# Patient Record
Sex: FEMALE | Race: WHITE | NOT HISPANIC OR LATINO | Employment: FULL TIME | URBAN - METROPOLITAN AREA
[De-identification: names, ages, dates, MRNs, and addresses within clinical notes are randomized per-mention and may not be internally consistent; named-entity substitution may affect disease eponyms.]

---

## 2018-01-07 PROBLEM — R07.9 CHEST PAIN: Status: ACTIVE | Noted: 2018-01-07

## 2018-01-08 PROBLEM — I20.0 UNSTABLE ANGINA: Status: ACTIVE | Noted: 2018-01-07

## 2018-05-29 ENCOUNTER — TELEPHONE (OUTPATIENT)
Dept: BARIATRICS | Facility: CLINIC | Age: 63
End: 2018-05-29

## 2019-05-26 PROBLEM — I25.118 CORONARY ARTERY DISEASE OF NATIVE HEART WITH STABLE ANGINA PECTORIS: Status: ACTIVE | Noted: 2019-05-26

## 2019-05-30 PROBLEM — Z79.899 ON STATIN THERAPY: Status: ACTIVE | Noted: 2019-05-30

## 2019-05-30 PROBLEM — Z79.82 ASPIRIN LONG-TERM USE: Status: ACTIVE | Noted: 2019-05-30

## 2020-01-04 PROBLEM — D64.9 ANEMIA: Status: ACTIVE | Noted: 2020-01-04

## 2020-01-07 PROBLEM — I73.00 RAYNAUD'S SYNDROME: Status: ACTIVE | Noted: 2020-01-07

## 2020-01-07 PROBLEM — E13.9 DIABETES 1.5, MANAGED AS TYPE 2: Status: ACTIVE | Noted: 2020-01-07

## 2020-02-02 PROBLEM — K58.9 IBS (IRRITABLE BOWEL SYNDROME): Chronic | Status: ACTIVE | Noted: 2020-02-02

## 2020-05-01 PROBLEM — M47.814 SPONDYLOSIS OF THORACIC SPINE: Status: ACTIVE | Noted: 2020-05-01

## 2020-11-13 PROBLEM — I20.0 UNSTABLE ANGINA: Status: RESOLVED | Noted: 2018-01-07 | Resolved: 2020-11-13

## 2020-11-13 PROBLEM — E13.9 DIABETES 1.5, MANAGED AS TYPE 2: Status: RESOLVED | Noted: 2020-01-07 | Resolved: 2020-11-13

## 2021-05-10 ENCOUNTER — PATIENT MESSAGE (OUTPATIENT)
Dept: RESEARCH | Facility: HOSPITAL | Age: 66
End: 2021-05-10

## 2021-05-26 PROBLEM — I25.10 CORONARY ARTERY DISEASE: Status: ACTIVE | Noted: 2018-01-08

## 2021-05-26 PROBLEM — E11.9 TYPE 2 DIABETES MELLITUS: Status: ACTIVE | Noted: 2019-03-21

## 2021-05-26 PROBLEM — F32.A DEPRESSIVE DISORDER: Status: ACTIVE | Noted: 2021-05-26

## 2021-05-26 PROBLEM — I10 ESSENTIAL HYPERTENSION: Status: ACTIVE | Noted: 2019-03-21

## 2021-06-21 ENCOUNTER — OFFICE VISIT (OUTPATIENT)
Dept: URGENT CARE | Facility: CLINIC | Age: 66
End: 2021-06-21
Payer: MEDICARE

## 2021-06-21 VITALS
OXYGEN SATURATION: 99 % | HEART RATE: 60 BPM | DIASTOLIC BLOOD PRESSURE: 59 MMHG | TEMPERATURE: 98 F | WEIGHT: 232.81 LBS | HEIGHT: 60 IN | RESPIRATION RATE: 16 BRPM | SYSTOLIC BLOOD PRESSURE: 128 MMHG | BODY MASS INDEX: 45.71 KG/M2

## 2021-06-21 DIAGNOSIS — H57.12 LEFT EYE PAIN: ICD-10-CM

## 2021-06-21 DIAGNOSIS — S05.02XA ABRASION OF LEFT CORNEA, INITIAL ENCOUNTER: Primary | ICD-10-CM

## 2021-06-21 PROCEDURE — 99214 OFFICE O/P EST MOD 30 MIN: CPT | Mod: S$GLB,,, | Performed by: PHYSICIAN ASSISTANT

## 2021-06-21 PROCEDURE — 99214 PR OFFICE/OUTPT VISIT, EST, LEVL IV, 30-39 MIN: ICD-10-PCS | Mod: S$GLB,,, | Performed by: PHYSICIAN ASSISTANT

## 2021-06-21 RX ORDER — OFLOXACIN 3 MG/ML
2 SOLUTION/ DROPS OPHTHALMIC 4 TIMES DAILY
Qty: 10 ML | Refills: 0 | Status: SHIPPED | OUTPATIENT
Start: 2021-06-21 | End: 2021-06-28

## 2022-02-17 PROBLEM — E11.51 TYPE 2 DIABETES MELLITUS WITH ATHEROSCLEROSIS OF AORTA: Status: ACTIVE | Noted: 2019-03-21

## 2022-02-17 PROBLEM — E11.59 TYPE 2 DIABETES MELLITUS WITH ATHEROSCLEROSIS OF AORTA: Status: ACTIVE | Noted: 2019-03-21

## 2022-02-17 PROBLEM — I70.0 TYPE 2 DIABETES MELLITUS WITH ATHEROSCLEROSIS OF AORTA: Status: ACTIVE | Noted: 2019-03-21

## 2022-03-16 ENCOUNTER — PATIENT OUTREACH (OUTPATIENT)
Dept: ADMINISTRATIVE | Facility: HOSPITAL | Age: 67
End: 2022-03-16
Payer: MEDICARE

## 2022-03-16 NOTE — PROGRESS NOTES
[facility] CMS chart audits. (FLU VACCINE) Chart review completed for HM test overdue (mammograms, Colonoscopies, pap smears, DM labs, and/or EYE EXAMs)       Care Everywhere and media, updates requested and reviewed.             There is no record of a Flu vaccine noted for the patient for the 2021 measurement year.

## 2022-07-31 PROBLEM — E11.59 TYPE 2 DIABETES MELLITUS WITH ATHEROSCLEROSIS OF AORTA: Chronic | Status: ACTIVE | Noted: 2019-03-21

## 2022-07-31 PROBLEM — E11.51 TYPE 2 DIABETES MELLITUS WITH ATHEROSCLEROSIS OF AORTA: Chronic | Status: ACTIVE | Noted: 2019-03-21

## 2022-07-31 PROBLEM — I70.0 TYPE 2 DIABETES MELLITUS WITH ATHEROSCLEROSIS OF AORTA: Chronic | Status: ACTIVE | Noted: 2019-03-21

## 2022-11-10 PROBLEM — Z79.899 ON STATIN THERAPY: Chronic | Status: ACTIVE | Noted: 2019-05-30

## 2022-11-10 PROBLEM — I73.00 RAYNAUD'S SYNDROME: Chronic | Status: ACTIVE | Noted: 2020-01-07

## 2022-11-10 PROBLEM — F32.A DEPRESSIVE DISORDER: Chronic | Status: ACTIVE | Noted: 2021-05-26

## 2022-11-10 PROBLEM — I10 ESSENTIAL HYPERTENSION: Chronic | Status: ACTIVE | Noted: 2019-03-21

## 2022-11-15 PROBLEM — Z00.01 ENCOUNTER FOR MEDICARE ANNUAL EXAMINATION WITH ABNORMAL FINDINGS: Status: ACTIVE | Noted: 2022-11-15

## 2022-11-15 PROBLEM — N95.0 POST-MENOPAUSAL BLEEDING: Status: ACTIVE | Noted: 2022-11-15

## 2022-11-15 PROBLEM — H35.30 MACULAR DEGENERATION OF BOTH EYES: Chronic | Status: ACTIVE | Noted: 2022-11-15

## 2022-11-16 PROBLEM — U09.9 COVID-19 LONG HAULER: Chronic | Status: ACTIVE | Noted: 2022-11-16

## 2022-11-16 PROBLEM — Z91.81 AT RISK FOR INJURY RELATED TO FALL: Chronic | Status: ACTIVE | Noted: 2022-11-16

## 2023-02-20 PROBLEM — Z00.01 ENCOUNTER FOR MEDICARE ANNUAL EXAMINATION WITH ABNORMAL FINDINGS: Status: RESOLVED | Noted: 2022-11-15 | Resolved: 2023-02-20

## 2023-11-27 PROBLEM — R06.02 SOB (SHORTNESS OF BREATH): Status: ACTIVE | Noted: 2023-11-27

## 2023-11-27 PROBLEM — E55.9 VITAMIN D DEFICIENCY: Status: ACTIVE | Noted: 2023-11-27

## 2023-12-04 ENCOUNTER — PATIENT MESSAGE (OUTPATIENT)
Dept: ADMINISTRATIVE | Facility: OTHER | Age: 68
End: 2023-12-04
Payer: MEDICARE

## 2023-12-05 PROBLEM — E79.0 HYPERURICEMIA: Status: ACTIVE | Noted: 2023-12-05

## 2023-12-05 PROBLEM — E61.2 MAGNESIUM DEFICIENCY: Status: ACTIVE | Noted: 2023-12-05

## 2023-12-21 ENCOUNTER — PATIENT MESSAGE (OUTPATIENT)
Dept: OTHER | Facility: OTHER | Age: 68
End: 2023-12-21
Payer: MEDICARE

## 2023-12-21 ENCOUNTER — PATIENT MESSAGE (OUTPATIENT)
Dept: ADMINISTRATIVE | Facility: OTHER | Age: 68
End: 2023-12-21
Payer: MEDICARE

## 2024-02-13 ENCOUNTER — PATIENT MESSAGE (OUTPATIENT)
Dept: ADMINISTRATIVE | Facility: OTHER | Age: 69
End: 2024-02-13
Payer: MEDICARE

## 2024-02-20 ENCOUNTER — PATIENT MESSAGE (OUTPATIENT)
Dept: OTHER | Facility: OTHER | Age: 69
End: 2024-02-20
Payer: MEDICARE

## 2024-03-07 ENCOUNTER — PATIENT MESSAGE (OUTPATIENT)
Dept: OTHER | Facility: OTHER | Age: 69
End: 2024-03-07
Payer: MEDICARE

## 2024-04-28 PROBLEM — E61.2 MAGNESIUM DEFICIENCY: Chronic | Status: ACTIVE | Noted: 2023-12-05

## 2024-04-28 PROBLEM — E55.9 VITAMIN D DEFICIENCY: Chronic | Status: ACTIVE | Noted: 2023-11-27

## 2024-04-28 PROBLEM — E79.0 HYPERURICEMIA: Chronic | Status: ACTIVE | Noted: 2023-12-05

## 2024-04-30 PROBLEM — F34.1 PERSISTENT DEPRESSIVE DISORDER: Status: ACTIVE | Noted: 2021-05-26

## 2024-04-30 PROBLEM — E66.2 CLASS 3 OBESITY WITH ALVEOLAR HYPOVENTILATION, SERIOUS COMORBIDITY, AND BODY MASS INDEX (BMI) OF 45.0 TO 49.9 IN ADULT: Chronic | Status: ACTIVE | Noted: 2024-04-30

## 2024-04-30 PROBLEM — E11.628 TYPE 2 DIABETES MELLITUS WITH PRESSURE CALLUS: Status: ACTIVE | Noted: 2019-03-21

## 2024-04-30 PROBLEM — J01.40 ACUTE NON-RECURRENT PANSINUSITIS: Status: ACTIVE | Noted: 2024-04-30

## 2024-04-30 PROBLEM — H35.3131 EARLY DRY STAGE NONEXUDATIVE AGE-RELATED MACULAR DEGENERATION OF BOTH EYES: Status: ACTIVE | Noted: 2022-11-15

## 2024-04-30 PROBLEM — L84 TYPE 2 DIABETES MELLITUS WITH PRESSURE CALLUS: Status: ACTIVE | Noted: 2019-03-21

## 2024-04-30 PROBLEM — E66.813 CLASS 3 OBESITY WITH ALVEOLAR HYPOVENTILATION, SERIOUS COMORBIDITY, AND BODY MASS INDEX (BMI) OF 45.0 TO 49.9 IN ADULT: Chronic | Status: ACTIVE | Noted: 2024-04-30

## 2024-04-30 PROBLEM — Z87.19 HISTORY OF PANCREATITIS: Status: ACTIVE | Noted: 2024-04-30

## 2024-08-05 PROBLEM — J01.40 ACUTE NON-RECURRENT PANSINUSITIS: Status: RESOLVED | Noted: 2024-04-30 | Resolved: 2024-08-05

## 2024-08-09 NOTE — PROGRESS NOTES
CC: Ms. Sheron Boudreaux arrives today for management of Type 2 DM and review of chronic medical conditions, as listed in the Visit Diagnosis section of this encounter.       HPI: Ms. Sheron Boudreaux was diagnosed with Type 2 DM in 2003. She was diagnosed based on lab work. Initial treatment consisted of metformin. + FH of DM in mother, father. Denies hospitalizations due to DM.   Of note, pt has past h/o isolated pancreatitis, following hysterectomy. Has followed with Dr. Romero.   She has dx of CAD with past stent placement. Follows with Dr. Solorzano.     This is her first time being seen in endocrine.     Primary Care recently added Jardiance but pt had to stop, due to cost.    She reports she has gained weight since recovering from COVID pneumonia in 2022 and has had difficulty losing.     BG readings are checked 1x/day, usually fasting. No logs to clinic. Reports these range 140s.    Hypoglycemia: No    Missing Insulin/PO medication doses: No    Exercise: Not lately. Has been taking care of 4 y/o twin grandchildren over summer break. Prior to that, was doing treadmill and weights at [facility].     Dietary Habits:  Eats 2-3 meals/day. Occasional snack in afternoon - fruit. Avoids sugary beverages.    Last DM education appointment: 3/2020      CURRENT DIABETIC MEDS:  metformin 1000 mg BID  Glucometer type:     Previous DM treatments:  Jardiance - cost    Last Eye Exam: 9/7/2023, Dr. Denny. No DR. + dry MD. Has appt this month.   Last Podiatry Exam: n/a    REVIEW OF SYSTEMS  Constitutional: no c/o fatigue, weakness, or weight loss.   Cardiac: no palpitations or chest pain.  Respiratory: no dyspnea. + occasional dry cough  GI: + chronic lower abdominal pain that waxes and wanes.  Reports dx of irritable bowel syndrome, + nausea in AM. Rare vomiting but may occur with eating animal proteins. + h/o pancreatitis x 1. Follows with Dr. Romero.   : denies urinary frequency, burning,  discharge, frequent UTIs  Skin: no lesions or rashes.  Neuro: + numbness to L lower leg that comes and goes.   Endocrine: denies polyphagia, polydipsia, polyuria      Personally reviewed Past Medical, Surgical, Social History.    Vital Signs  /74   Pulse 74   Ht 5' (1.524 m)   Wt 109.5 kg (241 lb 6.5 oz)   BMI 47.15 kg/m²     Personally reviewed the below labs:    Hemoglobin A1C   Date Value Ref Range Status   05/13/2024 7.0 (H) 0.0 - 5.6 % Final     Comment:     Reference Interval:  5.0 - 5.6 Normal   5.7 - 6.4 High Risk   > 6.5 Diabetic      Hgb A1c results are standardized based on the ([facility]) National   Glycohemoglobin Standardization Program.      Hemoglobin A1C levels are related to mean serum/plasma glucose   during the preceding 2-3 months.        12/02/2023 6.7 (H) 0.0 - 5.6 % Final     Comment:     Reference Interval:  5.0 - 5.6 Normal   5.7 - 6.4 High Risk   > 6.5 Diabetic      Hgb A1c results are standardized based on the ([facility]) National   Glycohemoglobin Standardization Program.      Hemoglobin A1C levels are related to mean serum/plasma glucose   during the preceding 2-3 months.        06/02/2023 6.8 (H) 0.0 - 5.6 % Final     Comment:     Reference Interval:  5.0 - 5.6 Normal   5.7 - 6.4 High Risk   > 6.5 Diabetic      Hgb A1c results are standardized based on the ([facility]) National   Glycohemoglobin Standardization Program.      Hemoglobin A1C levels are related to mean serum/plasma glucose   during the preceding 2-3 months.            Chemistry        Component Value Date/Time     05/13/2024 0911     07/16/2015 1000    K 4.6 05/13/2024 0911    K 4.5 07/16/2015 1000     05/13/2024 0911    CL 98 07/16/2015 1000    CO2 31 05/13/2024 0911    BUN 21 (H) 05/13/2024 0911    CREATININE 1.05 05/13/2024 0911    CREATININE 0.89 07/16/2015 1000     (H) 05/13/2024 0911        Component Value Date/Time    CALCIUM 9.1 05/13/2024 0911    ALKPHOS 82 05/13/2024 0911    AST 24 05/13/2024  0911    AST 13 (L) 04/09/2016 1013    ALT 23 05/13/2024 0911    BILITOT 0.3 05/13/2024 0911    ESTGFRAFRICA >60 02/26/2022 1014    EGFRNONAA >60 02/26/2022 1014          Lab Results   Component Value Date    CHOL 148 05/13/2024    CHOL 186 12/02/2023    CHOL 192 06/02/2023     Lab Results   Component Value Date    HDL 52 05/13/2024    HDL 44 12/02/2023    HDL 55 06/02/2023     Lab Results   Component Value Date    LDLCALC 62.4 (L) 05/13/2024    LDLCALC 112.6 12/02/2023    LDLCALC 108.8 06/02/2023     Lab Results   Component Value Date    TRIG 168 (H) 05/13/2024    TRIG 147 12/02/2023    TRIG 141 06/02/2023     Lab Results   Component Value Date    CHOLHDL 35.1 05/13/2024    CHOLHDL 23.7 12/02/2023    CHOLHDL 28.6 06/02/2023       Lab Results   Component Value Date    MICALBCREAT Unable to calculate 05/13/2024     Lab Results   Component Value Date    TSH 2.470 05/13/2024       CrCl cannot be calculated (Patient's most recent lab result is older than the maximum 7 days allowed.).    Vit D, 25-Hydroxy   Date Value Ref Range Status   05/13/2024 42 30 - 96 ng/mL Final     Comment:     Vitamin D deficiency.........<10 ng/mL                              Vitamin D insufficiency......10-29 ng/mL       Vitamin D sufficiency........> or equal to 30 ng/mL  Vitamin D toxicity............>100 ng/mL           PHYSICAL EXAMINATION  Constitutional: Appears well, no distress  Respiratory: CTA, even and unlabored.  Cardiovascular: RRR, no murmurs, no carotid bruits.  GI: bowel sounds active, no hernia noted  Skin: warm and dry; no visible wounds  Neuro: oriented to person, place, time  Feet: appropriate footwear.     Goals         80 <= Glucose <= 180 (pt-stated)       Blood Pressure < 140/90       Exercise at least 150 minutes per week.       HEMOGLOBIN A1C < 8.0       Take at least one BP reading per week at various times of the day               Assessment/Plan  1. Type 2 diabetes mellitus with other specified complication,  without long-term current use of insulin  -- A1c is reasonable but is due for updated labs. A1c, CMP today.   -- Cannot use GLP-1RA or DPP4-I due to h/o pancreatitis and underlying GI upset. SGLT2-I was too costly. Discussed adding SHETH but this is not favorable, due to possible side effects of weight gain, hypoglycemia. If needed, would choose lowest dose.   -- Pt agrees to inquire SGLT2-I cost with insurance. She doesn't think that she'll qualify for Patient Assistance  -- offered to change metformin to XR to see if this helps reduce her GI symptoms but she declined   -- takes aspirin, ace-I, statin   2. Coronary artery disease involving native coronary artery of native heart without angina pectoris  -- follows with cardiology    3. Essential hypertension  -- controlled  -- continue current medications   4. Pure hypercholesterolemia  -- uncontrolled with mildly elevated TRG  -- continue statin   5. Hypothyroidism, unspecified type  -- stable  -- continue current LT4 dose   6. History of pancreatitis  -- isolated episode  -- caution with use of GLP-1RA, DPP4-i         FOLLOW UP  Follow up in about 4 months (around 12/12/2024).   Patient instructed to bring BG logs to each follow up   Patient encouraged to call for any BG/medication issues, concerns, or questions.    Orders Placed This Encounter   Procedures    Hemoglobin A1C    Comprehensive Metabolic Panel

## 2024-08-12 ENCOUNTER — LAB VISIT (OUTPATIENT)
Dept: LAB | Facility: HOSPITAL | Age: 69
End: 2024-08-12
Attending: NURSE PRACTITIONER
Payer: MEDICARE

## 2024-08-12 ENCOUNTER — OFFICE VISIT (OUTPATIENT)
Dept: ENDOCRINOLOGY | Facility: CLINIC | Age: 69
End: 2024-08-12
Payer: MEDICARE

## 2024-08-12 VITALS
DIASTOLIC BLOOD PRESSURE: 74 MMHG | HEIGHT: 60 IN | WEIGHT: 241.38 LBS | HEART RATE: 74 BPM | SYSTOLIC BLOOD PRESSURE: 126 MMHG | BODY MASS INDEX: 47.39 KG/M2

## 2024-08-12 DIAGNOSIS — I10 ESSENTIAL HYPERTENSION: Chronic | ICD-10-CM

## 2024-08-12 DIAGNOSIS — I25.10 CORONARY ARTERY DISEASE INVOLVING NATIVE CORONARY ARTERY OF NATIVE HEART WITHOUT ANGINA PECTORIS: ICD-10-CM

## 2024-08-12 DIAGNOSIS — Z87.19 HISTORY OF PANCREATITIS: ICD-10-CM

## 2024-08-12 DIAGNOSIS — E11.69 TYPE 2 DIABETES MELLITUS WITH OTHER SPECIFIED COMPLICATION, WITHOUT LONG-TERM CURRENT USE OF INSULIN: Primary | ICD-10-CM

## 2024-08-12 DIAGNOSIS — E78.00 PURE HYPERCHOLESTEROLEMIA: Chronic | ICD-10-CM

## 2024-08-12 DIAGNOSIS — E03.9 HYPOTHYROIDISM, UNSPECIFIED TYPE: ICD-10-CM

## 2024-08-12 DIAGNOSIS — E11.69 TYPE 2 DIABETES MELLITUS WITH OTHER SPECIFIED COMPLICATION, WITHOUT LONG-TERM CURRENT USE OF INSULIN: ICD-10-CM

## 2024-08-12 LAB
ALBUMIN SERPL BCP-MCNC: 3.7 G/DL (ref 3.5–5.2)
ALP SERPL-CCNC: 92 U/L (ref 55–135)
ALT SERPL W/O P-5'-P-CCNC: 14 U/L (ref 10–44)
ANION GAP SERPL CALC-SCNC: 10 MMOL/L (ref 8–16)
AST SERPL-CCNC: 13 U/L (ref 10–40)
BILIRUB SERPL-MCNC: 0.4 MG/DL (ref 0.1–1)
BUN SERPL-MCNC: 17 MG/DL (ref 8–23)
CALCIUM SERPL-MCNC: 9.3 MG/DL (ref 8.7–10.5)
CHLORIDE SERPL-SCNC: 102 MMOL/L (ref 95–110)
CO2 SERPL-SCNC: 24 MMOL/L (ref 23–29)
CREAT SERPL-MCNC: 0.9 MG/DL (ref 0.5–1.4)
EST. GFR  (NO RACE VARIABLE): >60 ML/MIN/1.73 M^2
ESTIMATED AVG GLUCOSE: 137 MG/DL (ref 68–131)
GLUCOSE SERPL-MCNC: 118 MG/DL (ref 70–110)
HBA1C MFR BLD: 6.4 % (ref 4–5.6)
POTASSIUM SERPL-SCNC: 4.3 MMOL/L (ref 3.5–5.1)
PROT SERPL-MCNC: 7 G/DL (ref 6–8.4)
SODIUM SERPL-SCNC: 136 MMOL/L (ref 136–145)

## 2024-08-12 PROCEDURE — 99999 PR PBB SHADOW E&M-EST. PATIENT-LVL V: CPT | Mod: PBBFAC,,, | Performed by: NURSE PRACTITIONER

## 2024-08-12 PROCEDURE — 36415 COLL VENOUS BLD VENIPUNCTURE: CPT | Mod: PO | Performed by: NURSE PRACTITIONER

## 2024-08-12 PROCEDURE — 83036 HEMOGLOBIN GLYCOSYLATED A1C: CPT | Performed by: NURSE PRACTITIONER

## 2024-08-12 PROCEDURE — G2211 COMPLEX E/M VISIT ADD ON: HCPCS | Mod: S$PBB,,, | Performed by: NURSE PRACTITIONER

## 2024-08-12 PROCEDURE — 99204 OFFICE O/P NEW MOD 45 MIN: CPT | Mod: S$PBB,,, | Performed by: NURSE PRACTITIONER

## 2024-08-12 PROCEDURE — 99215 OFFICE O/P EST HI 40 MIN: CPT | Mod: PBBFAC,PO | Performed by: NURSE PRACTITIONER

## 2024-08-12 PROCEDURE — 80053 COMPREHEN METABOLIC PANEL: CPT | Performed by: NURSE PRACTITIONER

## 2025-01-27 ENCOUNTER — OFFICE VISIT (OUTPATIENT)
Dept: ENDOCRINOLOGY | Facility: CLINIC | Age: 70
End: 2025-01-27
Payer: MEDICARE

## 2025-01-27 VITALS
BODY MASS INDEX: 47.89 KG/M2 | HEIGHT: 60 IN | WEIGHT: 243.94 LBS | SYSTOLIC BLOOD PRESSURE: 132 MMHG | HEART RATE: 83 BPM | DIASTOLIC BLOOD PRESSURE: 64 MMHG

## 2025-01-27 DIAGNOSIS — I10 ESSENTIAL HYPERTENSION: ICD-10-CM

## 2025-01-27 DIAGNOSIS — I25.10 CORONARY ARTERY DISEASE INVOLVING NATIVE CORONARY ARTERY OF NATIVE HEART WITHOUT ANGINA PECTORIS: ICD-10-CM

## 2025-01-27 DIAGNOSIS — E78.00 PURE HYPERCHOLESTEROLEMIA: ICD-10-CM

## 2025-01-27 DIAGNOSIS — E66.813 CLASS 3 SEVERE OBESITY DUE TO EXCESS CALORIES WITHOUT SERIOUS COMORBIDITY WITH BODY MASS INDEX (BMI) OF 45.0 TO 49.9 IN ADULT: ICD-10-CM

## 2025-01-27 DIAGNOSIS — Z87.19 HISTORY OF PANCREATITIS: ICD-10-CM

## 2025-01-27 DIAGNOSIS — E11.69 TYPE 2 DIABETES MELLITUS WITH OTHER SPECIFIED COMPLICATION, WITHOUT LONG-TERM CURRENT USE OF INSULIN: Primary | ICD-10-CM

## 2025-01-27 DIAGNOSIS — E03.9 HYPOTHYROIDISM, UNSPECIFIED TYPE: ICD-10-CM

## 2025-01-27 DIAGNOSIS — E66.01 CLASS 3 SEVERE OBESITY DUE TO EXCESS CALORIES WITHOUT SERIOUS COMORBIDITY WITH BODY MASS INDEX (BMI) OF 45.0 TO 49.9 IN ADULT: ICD-10-CM

## 2025-01-27 PROCEDURE — 99214 OFFICE O/P EST MOD 30 MIN: CPT | Mod: PBBFAC,PO | Performed by: NURSE PRACTITIONER

## 2025-01-27 PROCEDURE — 99999 PR PBB SHADOW E&M-EST. PATIENT-LVL IV: CPT | Mod: PBBFAC,,, | Performed by: NURSE PRACTITIONER

## 2025-01-27 NOTE — PROGRESS NOTES
CC: Ms. Sheron Boudreaux arrives today for management of Type 2 DM and review of chronic medical conditions, as listed in the Visit Diagnosis section of this encounter.       HPI: Ms. Sheron Boudreaux was diagnosed with Type 2 DM in 2003. She was diagnosed based on lab work. Initial treatment consisted of metformin. + FH of DM in mother, father. Denies hospitalizations due to DM.   Of note, pt has past h/o isolated pancreatitis, following hysterectomy. Has followed with Dr. Romero.   She has dx of CAD with past stent placement. Follows with Dr. Solorzano.     Patient was last seen be me in August.    Branded medications have been cost prohibitive. She now has new insurance and is curious about the cost of branded meds.     BG readings are checked sporadically.     Hypoglycemia: No    Missing Insulin/PO medication doses: No    Exercise: No formal.    Dietary Habits:  Eats 2-3 meals/day.  Avoids sugary beverages.    Last DM education appointment: 3/2020      CURRENT DIABETIC MEDS:  metformin 1000 mg BID  Glucometer type:     Previous DM treatments:  Jardiance - cost    Last Eye Exam: 8/2024, Dr. Denny. No DR. + dry MD.   Last Podiatry Exam: n/a    REVIEW OF SYSTEMS  Constitutional: no c/o fatigue, weakness, or weight loss.   Cardiac: no palpitations or chest pain.  Respiratory: no cough, dyspnea.   GI: no nausea, abdominal pain.  + h/o pancreatitis x 1. Reports dx of irritable bowel syndrome, Follows with Dr. Romero.   : denies urinary frequency, burning, discharge, frequent UTIs  Skin: no lesions or rashes.  Neuro: no numbness, tingling, paresthesias   Endocrine: denies polyphagia, polydipsia, polyuria      Personally reviewed Past Medical, Surgical, Social History.    Vital Signs  /64   Pulse 83   Ht 5' (1.524 m)   Wt 110.6 kg (243 lb 15 oz)   BMI 47.64 kg/m²     Personally reviewed the below labs:    Hemoglobin A1C   Date Value Ref Range Status   01/25/2025 6.9 (H) 4.0 - 5.6 % Final     Comment:      Reference Interval:  5.0 - 5.6 Normal   5.7 - 6.4 High Risk   > 6.5 Diabetic      Hgb A1c results are standardized based on the ([facility]) National   Glycohemoglobin Standardization Program.      Hemoglobin A1C levels are related to mean serum/plasma glucose   during the preceding 2-3 months.        08/12/2024 6.4 (H) 4.0 - 5.6 % Final     Comment:     ADA Screening Guidelines:  5.7-6.4%  Consistent with prediabetes  >or=6.5%  Consistent with diabetes    High levels of fetal hemoglobin interfere with the HbA1C  assay. Heterozygous hemoglobin variants (HbS, HgC, etc)do  not significantly interfere with this assay.   However, presence of multiple variants may affect accuracy.     05/13/2024 7.0 (H) 0.0 - 5.6 % Final     Comment:     Reference Interval:  5.0 - 5.6 Normal   5.7 - 6.4 High Risk   > 6.5 Diabetic      Hgb A1c results are standardized based on the ([facility]) National   Glycohemoglobin Standardization Program.      Hemoglobin A1C levels are related to mean serum/plasma glucose   during the preceding 2-3 months.            Chemistry        Component Value Date/Time     01/25/2025 1151     01/25/2025 1151     07/16/2015 1000    K 4.4 01/25/2025 1151    K 4.4 01/25/2025 1151    K 4.5 07/16/2015 1000     01/25/2025 1151     01/25/2025 1151    CL 98 07/16/2015 1000    CO2 28 01/25/2025 1151    CO2 28 01/25/2025 1151    BUN 17 01/25/2025 1151    BUN 17 01/25/2025 1151    CREATININE 0.93 01/25/2025 1151    CREATININE 0.93 01/25/2025 1151    CREATININE 0.89 07/16/2015 1000     (H) 01/25/2025 1151     (H) 01/25/2025 1151        Component Value Date/Time    CALCIUM 9.1 01/25/2025 1151    CALCIUM 9.1 01/25/2025 1151    ALKPHOS 93 01/25/2025 1151    ALKPHOS 93 01/25/2025 1151    AST 16 01/25/2025 1151    AST 16 01/25/2025 1151    AST 13 (L) 04/09/2016 1013    ALT 15 01/25/2025 1151    ALT 15 01/25/2025 1151    BILITOT 0.3 01/25/2025 1151    BILITOT 0.3 01/25/2025 1151     ESTGFRAFRICA >60 02/26/2022 1014    EGFRNONAA >60 02/26/2022 1014          Lab Results   Component Value Date    CHOL 159 01/25/2025    CHOL 148 05/13/2024    CHOL 186 12/02/2023     Lab Results   Component Value Date    HDL 51 01/25/2025    HDL 52 05/13/2024    HDL 44 12/02/2023     Lab Results   Component Value Date    LDLCALC 83.6 01/25/2025    LDLCALC 62.4 (L) 05/13/2024    LDLCALC 112.6 12/02/2023     Lab Results   Component Value Date    TRIG 122 01/25/2025    TRIG 168 (H) 05/13/2024    TRIG 147 12/02/2023     Lab Results   Component Value Date    CHOLHDL 32.1 01/25/2025    CHOLHDL 35.1 05/13/2024    CHOLHDL 23.7 12/02/2023       Lab Results   Component Value Date    MICALBCREAT Unable to calculate 01/25/2025     Lab Results   Component Value Date    TSH 1.120 01/25/2025       CrCl cannot be calculated (Unknown ideal weight.).    Vit D, 25-Hydroxy   Date Value Ref Range Status   01/25/2025 36 30 - 96 ng/mL Final     Comment:     Vitamin D deficiency.........<10 ng/mL                              Vitamin D insufficiency......10-29 ng/mL       Vitamin D sufficiency........> or equal to 30 ng/mL  Vitamin D toxicity............>100 ng/mL           PHYSICAL EXAMINATION  Constitutional: Appears well, no distress  Respiratory: CTA, even and unlabored.  Cardiovascular: RRR, no murmurs  GI: bowel sounds active, no hernia noted  Skin: warm and dry; no visible wounds  Neuro: oriented to person, place, time  Feet: appropriate footwear.     Goals         80 <= Glucose <= 180 (pt-stated)       Blood Pressure < 140/90       Exercise at least 150 minutes per week.       HEMOGLOBIN A1C < 8.0       Take at least one BP reading per week at various times of the day               Assessment/Plan  1. Type 2 diabetes mellitus with other specified complication, without long-term current use of insulin  -- A1c is reasonable but trending up.  -- will assess cost for Jardiance.   -- Begin Jardiance 10 mg daily. Discussed mechanism of  action and side effects, including genital mycotic infections, UTIs, dehydration, ketoacidosis. Encouraged increased hydration. Avoid diets with extreme carb restriction.   -- continue metformin  -- consider GLP-1RA in the future. Aware of 1 past pancreatitis episode. Would assess lipase before, during treatment.    -- takes aspirin, ace-I, statin   2. Coronary artery disease involving native coronary artery of native heart without angina pectoris  -- follows with cardiology    3. Essential hypertension  -- controlled  -- continue current medications   4. Pure hypercholesterolemia  -- controlled   -- continue statin   5. Hypothyroidism, unspecified type  -- stable  -- continue current LT4 dose   6. History of pancreatitis  -- isolated episode  -- caution with use of GLP-1RA, DPP4-i   7. Class 3 severe obesity due to excess calories without serious comorbidity with body mass index (BMI) of 45.0 to 49.9 in adult  -- uncontrolled  Body mass index is 47.64 kg/m².         FOLLOW UP  Follow up in about 6 months (around 7/27/2025).   Patient instructed to bring BG logs to each follow up   Patient encouraged to call for any BG/medication issues, concerns, or questions.    Orders Placed This Encounter   Procedures    Hemoglobin A1C    Comprehensive Metabolic Panel

## 2025-04-07 ENCOUNTER — OFFICE VISIT (OUTPATIENT)
Dept: OTOLARYNGOLOGY | Facility: CLINIC | Age: 70
End: 2025-04-07
Payer: MEDICARE

## 2025-04-07 ENCOUNTER — CLINICAL SUPPORT (OUTPATIENT)
Dept: AUDIOLOGY | Facility: CLINIC | Age: 70
End: 2025-04-07
Payer: MEDICARE

## 2025-04-07 DIAGNOSIS — T16.1XXA FOREIGN BODY OF RIGHT EAR, INITIAL ENCOUNTER: Primary | ICD-10-CM

## 2025-04-07 DIAGNOSIS — H93.13 TINNITUS, BILATERAL: ICD-10-CM

## 2025-04-07 DIAGNOSIS — H90.3 BILATERAL HIGH FREQUENCY SENSORINEURAL HEARING LOSS: ICD-10-CM

## 2025-04-07 DIAGNOSIS — H90.3 BILATERAL HIGH FREQUENCY SENSORINEURAL HEARING LOSS: Primary | ICD-10-CM

## 2025-04-07 PROCEDURE — 99203 OFFICE O/P NEW LOW 30 MIN: CPT | Mod: 25,S$GLB,, | Performed by: NURSE PRACTITIONER

## 2025-04-07 PROCEDURE — 3044F HG A1C LEVEL LT 7.0%: CPT | Mod: CPTII,S$GLB,, | Performed by: NURSE PRACTITIONER

## 2025-04-07 PROCEDURE — 69200 CLEAR OUTER EAR CANAL: CPT | Mod: RT,S$GLB,, | Performed by: NURSE PRACTITIONER

## 2025-04-07 PROCEDURE — 4010F ACE/ARB THERAPY RXD/TAKEN: CPT | Mod: CPTII,S$GLB,, | Performed by: NURSE PRACTITIONER

## 2025-04-07 PROCEDURE — 92567 TYMPANOMETRY: CPT | Mod: S$GLB,,, | Performed by: AUDIOLOGIST-HEARING AID FITTER

## 2025-04-07 PROCEDURE — 3066F NEPHROPATHY DOC TX: CPT | Mod: CPTII,S$GLB,, | Performed by: NURSE PRACTITIONER

## 2025-04-07 PROCEDURE — 92557 COMPREHENSIVE HEARING TEST: CPT | Mod: S$GLB,,, | Performed by: AUDIOLOGIST-HEARING AID FITTER

## 2025-04-07 PROCEDURE — 3061F NEG MICROALBUMINURIA REV: CPT | Mod: CPTII,S$GLB,, | Performed by: NURSE PRACTITIONER

## 2025-04-07 NOTE — PROGRESS NOTES
Subjective     Patient ID: Sheron Boudreaux is a 69 y.o. female.    Chief Complaint: No chief complaint on file.    HPI  Patient is new to ENT, self-referred for hearing loss and suspected ceruminosis. Patient saw Dr. Ashley's NP 2 weeks ago who noted cerumen impaction AD, advised Debrox which did not help. Muffled hearing AD. Denies otologic h/o surgery or trauma. Mother had age-related hearing loss. 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 Seldom has very mild brief tinnitus, nothing bothersome.     Review of Systems   Constitutional: Negative.    HENT:  Positive for hearing loss.    Eyes: Negative.    Respiratory: Negative.     Cardiovascular: Negative.    Gastrointestinal: Negative.    Musculoskeletal: Negative.    Integumentary:  Negative.   Neurological: Negative.    Hematological: Negative.    Psychiatric/Behavioral: Negative.          Objective     Physical Exam  Vitals and nursing note reviewed.   Constitutional:       General: She is not in acute distress.     Appearance: She is well-developed. She is not ill-appearing.   HENT:      Head: Normocephalic and atraumatic.      Right Ear: Hearing, tympanic membrane, ear canal and external ear normal. No middle ear effusion. Tympanic membrane is not erythematous.      Left Ear: Hearing, tympanic membrane, ear canal and external ear normal.  No middle ear effusion. Tympanic membrane is not erythematous.      Nose: Nose normal.   Eyes:      General: Lids are normal. No scleral icterus.        Right eye: No discharge.         Left eye: No discharge.   Neck:      Trachea: Trachea normal. No tracheal deviation.   Cardiovascular:      Rate and Rhythm: Normal rate.   Pulmonary:      Effort: Pulmonary effort is normal. No respiratory distress.      Breath sounds: No stridor. No wheezing.   Musculoskeletal:         General: Normal range of motion.      Cervical back: Normal range of motion and neck supple.   Skin:     General: Skin is warm and dry.   Neurological:       Mental Status: She is alert and oriented to person, place, and time.      Coordination: Coordination normal.      Gait: Gait normal.   Psychiatric:         Attention and Perception: Attention normal.         Mood and Affect: Mood normal.         Speech: Speech normal.         Behavior: Behavior normal. Behavior is cooperative.     SEPARATE PROCEDURE IN OFFICE:   Procedure: Removal of foreign body, RIGHT  Pre Procedure Diagnosis: Foreign body of ear canal  Post Procedure Diagnosis: Foreign body of ear canal  Verbal informed consent in regards to risk of trauma to ear canal, ear drum or hearing, discomfort during procedure and/or inability to remove cerumen impaction in one session or unforeseen events or complications.   No anesthesia.     Procedure in detail:   Ear canal visualized bilateral with appropriate size ear speculum utilizing Operating Head Binocular Otomicroscope   Utilizing the following: delicate alligator forceps used. Cotton swab head was removed atraumatically. The TM and EAC were then inspected and found to be clear of wax. See description of TMs/EACs in PE above.   Complications: No   Condition: Improved/Good       Assessment and Plan     1. Foreign body of right ear, initial encounter    2. Bilateral high frequency sensorineural hearing loss      Patient encouraged to return to clinic if symptoms worsen/persist and as needed for further ENT symptoms or concerns.   Recommend repeat audiogram in 1 year and wear hearing protection around loud noise         No follow-ups on file.

## 2025-04-07 NOTE — PROGRESS NOTES
Sheron Boudreaux was seen on 04/07/2025 for an audiological evaluation. Pt was alone during today's visit. Pertinent complaints today include hearing loss and tinnitus AU. 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 Otoscopy revealed excessive cerumen in the right ear that was removed by Loni Colvin NP, ENT, prior to testing. The tympanic membrane was visualized AU prior to proceeding with the hearing testing.     Results reveal a bilateral normal through 4K Hz sloping to mild HF sensorineural hearing loss.    Speech Reception Thresholds were  10 dBHL for the right ear and 10 dBHL for the left ear.    Word recognition scores were good for the right ear and excellent for the left ear.   Tympanograms were Type A for the right ear and Type A for the left ear.    Audiogram results were reviewed in detail with patient and all questions were answered. Results will be reviewed by the referring provider at the completion of this note. All complaints were addressed during this visit to the patient's satisfaction. Recommend repeat hearing testing in one year due to noise exposure and tinnitus and bilateral hearing protection with either muffs or in-ear protection in loud noises. Plan of care was discussed in detail with the patient, who agreed with the plan as above.

## 2025-04-21 ENCOUNTER — OFFICE VISIT (OUTPATIENT)
Dept: OBSTETRICS AND GYNECOLOGY | Facility: CLINIC | Age: 70
End: 2025-04-21
Payer: MEDICARE

## 2025-04-21 VITALS — DIASTOLIC BLOOD PRESSURE: 66 MMHG | BODY MASS INDEX: 46.2 KG/M2 | SYSTOLIC BLOOD PRESSURE: 124 MMHG | WEIGHT: 236.56 LBS

## 2025-04-21 DIAGNOSIS — Z01.419 ENCOUNTER FOR GYNECOLOGICAL EXAMINATION (GENERAL) (ROUTINE) WITHOUT ABNORMAL FINDINGS: Primary | ICD-10-CM

## 2025-04-21 DIAGNOSIS — K59.09 CHRONIC CONSTIPATION: ICD-10-CM

## 2025-04-21 DIAGNOSIS — Z12.31 ENCOUNTER FOR SCREENING MAMMOGRAM FOR BREAST CANCER: ICD-10-CM

## 2025-04-21 DIAGNOSIS — N81.6 POP-Q STAGE 2 RECTOCELE: ICD-10-CM

## 2025-04-21 PROCEDURE — 99999 PR PBB SHADOW E&M-EST. PATIENT-LVL IV: CPT | Mod: PBBFAC,,, | Performed by: OBSTETRICS & GYNECOLOGY

## 2025-04-21 NOTE — PROGRESS NOTES
Chief Complaint   Patient presents with    Annual Exam       History and Physical:  Sheron Boudreaux is a 69 y.o. F who presents today for her routine annual GYN exam. The patient has no Gynecology complaints.    Annual:   Robotic assisted laparoscopic hysterectomy/bilateral salpingo-oophorectomy/GREG 2/2023 due to postmenopausal bleeding: BENIGN ENDOMETRIAL HYPERPLASIA, adenomyosis  Last Pap: 9/2024 normal per patient.   History of abnormal pap: yes, 1980, biopsy normal, normal since then  Last Mammogram: 9/3/2024 normal per patient.   Colon: 6/2024, pylops, repeat 3 years, 6/2027  DEXA: Osteopenia 12/2023; next 12/2026  PCP: MARY Ashley Jr., MD orders routine lab 1/2025  Immunization status: up to date and documented. Did not get zoster  Allergies:   Review of patient's allergies indicates:   Allergen Reactions    Atorvastatin Other (See Comments)     Myalgia, high sed rate    Gentamicin Hives     Pt not sure maybe hives    Zocor [simvastatin] Other (See Comments)     myalgia    Sulfa (sulfonamide antibiotics) Other (See Comments)     Pt states that it wasn't a rash, but half of her turned red.      Past Medical History:   Diagnosis Date    Achilles tendinitis     Acute pancreatitis     Anemia     Anticoagulant long-term use     Arthritis     Bilateral Hip Pain 10/26/2016    10/26/16 RXd A Medrol Dose Pack, Then Anaprox PRN,  And Ordered Bilateral Hip XRays; She Has Seen Dr. Ravinder Clements In The Past    Blockage of coronary artery of heart 01/2018    dr arevalo put in stent     Chronic rhinitis     Depression     Diabetes 1.5, managed as type 2     Diabetes with neurologic complications     Diastolic dysfunction     Diverticulitis large intestine w/o perforation or abscess w/o bleeding     repeat in 3 years     Fracture 2003    (R); metatarsals  no treatment    Fractures 1956    (R) forearm    GERD (gastroesophageal reflux disease)     HTN (hypertension)     Hyperlipidemia     Hypothyroidism     IBS  (irritable bowel syndrome)     Migraine-cluster headache syndrome     Obesity     Obstructive sleep apnea     Palpitation     Plantar fasciitis     left foot (Achilles); conservative treatment    Pneumonia     Raynaud's syndrome     Shingles 2022    Sleep apnea     Trouble in sleeping     Type 2 diabetes with peripheral circulatory disorder, controlled      Past Surgical History:   Procedure Laterality Date     SECTION      2x    CHOLECYSTECTOMY  2012    COLONOSCOPY  2019    repeat in 3 years     COLONOSCOPY W/ POLYPECTOMY N/A 2022    Dr. Romero--no recommendation for repeat in this report    COLONOSCOPY W/ POLYPECTOMY  2024    Dr Romero    CORONARY ANGIOPLASTY WITH STENT PLACEMENT  2017    LAD    CORONARY STENT PLACEMENT Left     posterior LAD    CYSTOSCOPY N/A 2023    Procedure: CYSTOSCOPY;  Surgeon: Hector Esquivel MD;  Location: [facility] OR;  Service: OB/GYN;  Laterality: N/A;    DILATION AND CURETTAGE OF UTERUS      1x    ROBOT-ASSISTED LAPAROSCOPIC HYSTERECTOMY N/A 2023    Procedure: ROBOTIC HYSTERECTOMY;  Surgeon: Hector Esquivel MD;  Location: [facility] OR;  Service: OB/GYN;  Laterality: N/A;    ROBOT-ASSISTED LAPAROSCOPIC SALPINGO-OOPHORECTOMY Bilateral 2023    Procedure: ROBOTIC SALPINGO-OOPHORECTOMY;  Surgeon: Hector Esquivel MD;  Location: [facility] OR;  Service: OB/GYN;  Laterality: Bilateral;    ROBOT-ASSISTED LYSIS OF ADHESIONS N/A 2023    Procedure: ROBOTIC LYSIS, ADHESIONS;  Surgeon: Hector Esquivel MD;  Location: [facility] OR;  Service: OB/GYN;  Laterality: N/A;     MEDS: Medications Ordered Prior to Encounter[1]  OB History    No obstetric history on file.       Social History[2]  Family History   Problem Relation Name Age of Onset    Leukemia Mother      Diabetes Mother      Heart disease Mother      Hypertension Mother      Coronary artery disease Mother      Kidney failure Father      Diabetes Father      Hypertension Father      Coronary artery  disease Father         Past medical and surgical history reviewed.   I have reviewed the patient's medical history in detail and updated the computerized patient record.    Review of System:   General: no chills, fever, night sweats, weight gain or weight loss  Breasts: no new or changing breast lumps, nipple discharge or masses.  Gastrointestinal: no abdominal pain, change in bowel habits, or black or bloody stools  Genito-Urinary: no incontinence, urinary frequency/urgency or vulvar/vaginal symptoms, pelvic pain or abnormal vaginal bleeding.    Physical Exam:   There were no vitals taken for this visit.  There is no height or weight on file to calculate BMI.  Constitutional: She appears alert and responsive. She appears well-developed, well-groomed, and well-nourished. No distress.  Breasts: are symmetrical.  Right breast exhibits no inverted nipple, no mass, no nipple discharge, no skin change and no tenderness.  Left breast exhibits no inverted nipple, no mass, no nipple discharge, no skin change and no tenderness.  Abdominal: Soft. She exhibits no distension, hernias or masses. There is no tenderness. No enlargement of liver edge or spleen.  There is no rebound and no guarding.   Genitourinary:    External rectal exam shows no thrombosed external hemorrhoids, no lesions.     Pelvic exam was performed with patient supine.   No labial fusion, and symmetrical.    There is no rash, lesion or injury on the right labia.   There is no rash, lesion or injury on the left labia.   No bleeding and no signs of injury around the vaginal introitus, urethral meatus is normal size and without prolapse or lesions, urethra well supported.    No vaginal discharge found.   Mild stage I cystocele and large stage II rectocele noted.   Bimanual exam:   The urethra is normal to palpation and there are no palpable vaginal wall masses.   Right adnexum displays no mass or nodularity and no tenderness.   Left adnexum displays no mass or  nodularity and no tenderness.    Assessment/Plan:   Encounter for gynecological examination (general) (routine) without abnormal findings    Encounter for screening mammogram for breast cancer  -     Mammo Digital Screening Bilat w/ Jayson (XPD); Future; Expected date: 09/04/2025    POP-Q stage 2 rectocele  -     Ambulatory referral/consult to Urogynecology    Chronic constipation  -     Ambulatory referral/consult to Urogynecology    Stage II rectocele with chronic constipation. Discussed pessary vs surgery. Referral to UroGyn.   Constipation: increase fiber in diet, increase hydration, start Colace BID and Miralax as needed.     Follow up in 1 year.         [1]   Current Outpatient Medications on File Prior to Visit   Medication Sig Dispense Refill    ANTIOX#10/OM3/DHA/EPA/LUT/ZEAX (I-CAPS ORAL) Take 1 tablet by mouth Daily.      blood sugar diagnostic Strp One touch verio gold test strips----use to take blood sugar daily 100 strip 6    buPROPion (WELLBUTRIN XL) 150 MG TB24 tablet Take 1 tablet (150 mg total) by mouth once daily. 90 tablet 1    butalbital-aspirin-caffeine -40 mg (FIORINAL) -40 mg Cap Take 1 capsule by mouth every 6 (six) hours as needed (headache). 30 capsule 0    co-enzyme Q-10 30 mg capsule Take 100 mg by mouth once daily.      empagliflozin (JARDIANCE) 10 mg tablet Take 1 tablet (10 mg total) by mouth once daily. 90 tablet 3    ergocalciferol (ERGOCALCIFEROL) 50,000 unit Cap TAKE 1 CAPSULE BY MOUTH ONE TIME PER WEEK 12 capsule 3    ezetimibe (ZETIA) 10 mg tablet TAKE 1 TABLET DAILY 90 tablet 3    ferrous gluconate (FERGON) 240 (27 FE) MG tablet Take 240 mg by mouth once daily.      hydroCHLOROthiazide (HYDRODIURIL) 25 MG tablet TAKE 1 TABLET DAILY 90 tablet 3    levothyroxine (SYNTHROID) 112 MCG tablet Take 1 tablet (112 mcg total) by mouth before breakfast. 90 tablet 1    lisinopriL (PRINIVIL,ZESTRIL) 5 MG tablet TAKE 1 TABLET DAILY 90 tablet 3    magnesium oxide (MAG-OX) 400 mg  (241.3 mg magnesium) tablet TAKE 1 TABLET BY MOUTH ONCE DAILY 90 tablet 1    metFORMIN (GLUCOPHAGE) 500 MG tablet Take 2 tablets (1,000 mg total) by mouth 2 (two) times daily with meals. 360 tablet 1    metoprolol succinate (TOPROL-XL) 25 MG 24 hr tablet TAKE 1 TABLET TWICE A  tablet 1    omega-3 fatty acids/fish oil (FISH OIL-OMEGA-3 FATTY ACIDS) 300-1,000 mg capsule Take 1 capsule by mouth once daily.      pantoprazole (PROTONIX) 40 MG tablet Take 1 tablet (40 mg total) by mouth once daily. 90 tablet 1    paroxetine (PAXIL) 20 MG tablet Take 1 tablet (20 mg total) by mouth every morning. 90 tablet 3    pravastatin (PRAVACHOL) 80 MG tablet TAKE 1 TABLET DAILY 90 tablet 1    aspirin (ECOTRIN) 81 MG EC tablet Take 1 tablet (81 mg total) by mouth once daily.  0    blood-glucose meter Misc 1 kit by Misc.(Non-Drug; Combo Route) route once daily. 1 each 0    valACYclovir (VALTREX) 1000 MG tablet Take 1 tablet (1,000 mg total) by mouth 3 (three) times daily. 21 tablet 0     No current facility-administered medications on file prior to visit.   [2]   Social History  Socioeconomic History    Marital status:     Number of children: 3   Occupational History    Occupation: RN dialysis nurse     Comment: prn   Tobacco Use    Smoking status: Never    Smokeless tobacco: Never   Substance and Sexual Activity    Alcohol use: Yes     Alcohol/week: 1.0 standard drink of alcohol     Types: 1 Glasses of wine per week     Comment: occasionally    Drug use: Never    Sexual activity: Not Currently     Partners: Male     Birth control/protection: None     Social Drivers of Health     Financial Resource Strain: Low Risk  (4/1/2025)    Overall Financial Resource Strain (CARDIA)     Difficulty of Paying Living Expenses: Not very hard   Food Insecurity: No Food Insecurity (4/1/2025)    Hunger Vital Sign     Worried About Running Out of Food in the Last Year: Never true     Ran Out of Food in the Last Year: Never true    Transportation Needs: No Transportation Needs (4/1/2025)    PRAPARE - Transportation     Lack of Transportation (Medical): No     Lack of Transportation (Non-Medical): No   Physical Activity: Inactive (4/1/2025)    Exercise Vital Sign     Days of Exercise per Week: 0 days     Minutes of Exercise per Session: 0 min   Stress: Stress Concern Present (4/1/2025)    [employer] - Occupational Stress Questionnaire     Feeling of Stress : To some extent   Housing Stability: Low Risk  (4/1/2025)    Housing Stability Vital Sign     Unable to Pay for Housing in the Last Year: No     Number of Times Moved in the Last Year: 0     Homeless in the Last Year: No

## 2025-04-25 ENCOUNTER — TELEPHONE (OUTPATIENT)
Dept: PHARMACY | Facility: CLINIC | Age: 70
End: 2025-04-25
Payer: MEDICARE

## 2025-04-25 NOTE — TELEPHONE ENCOUNTER
[facility]/Population Health Chart Review & Patient Outreach Details For Medication Adherence Project    Reason for Outreach Encounter: 3rd Party payor non-compliance report ([employer], [employer], C, etc)  2.  Patient Outreach Method: Reviewed Patient Chart  3.   Medication in question: jardiance   LAST FILLED: 4/14/25 for 90 day supply  Diabetes Medications              empagliflozin (JARDIANCE) 10 mg tablet Take 1 tablet (10 mg total) by mouth once daily.    metFORMIN (GLUCOPHAGE) 500 MG tablet Take 2 tablets (1,000 mg total) by mouth 2 (two) times daily with meals.              4.  Reviewed and or Updates Made To: Patient Chart  5. Outreach Outcomes and/or actions taken: Patient filled medication and is on track to be adherent

## 2025-06-27 ENCOUNTER — TELEPHONE (OUTPATIENT)
Dept: ENDOCRINOLOGY | Facility: CLINIC | Age: 70
End: 2025-06-27
Payer: MEDICARE

## 2025-06-30 ENCOUNTER — OFFICE VISIT (OUTPATIENT)
Dept: ENDOCRINOLOGY | Facility: CLINIC | Age: 70
End: 2025-06-30
Payer: MEDICARE

## 2025-06-30 VITALS
DIASTOLIC BLOOD PRESSURE: 60 MMHG | HEIGHT: 60 IN | BODY MASS INDEX: 46.79 KG/M2 | SYSTOLIC BLOOD PRESSURE: 120 MMHG | WEIGHT: 238.31 LBS | HEART RATE: 63 BPM

## 2025-06-30 DIAGNOSIS — E66.813 CLASS 3 SEVERE OBESITY DUE TO EXCESS CALORIES WITHOUT SERIOUS COMORBIDITY WITH BODY MASS INDEX (BMI) OF 45.0 TO 49.9 IN ADULT: ICD-10-CM

## 2025-06-30 DIAGNOSIS — E11.69 TYPE 2 DIABETES MELLITUS WITH OTHER SPECIFIED COMPLICATION, WITHOUT LONG-TERM CURRENT USE OF INSULIN: Primary | ICD-10-CM

## 2025-06-30 DIAGNOSIS — Z87.19 HISTORY OF PANCREATITIS: ICD-10-CM

## 2025-06-30 DIAGNOSIS — E03.9 HYPOTHYROIDISM, UNSPECIFIED TYPE: ICD-10-CM

## 2025-06-30 DIAGNOSIS — E78.00 PURE HYPERCHOLESTEROLEMIA: ICD-10-CM

## 2025-06-30 DIAGNOSIS — I25.10 CORONARY ARTERY DISEASE INVOLVING NATIVE CORONARY ARTERY OF NATIVE HEART WITHOUT ANGINA PECTORIS: ICD-10-CM

## 2025-06-30 DIAGNOSIS — E66.01 CLASS 3 SEVERE OBESITY DUE TO EXCESS CALORIES WITHOUT SERIOUS COMORBIDITY WITH BODY MASS INDEX (BMI) OF 45.0 TO 49.9 IN ADULT: ICD-10-CM

## 2025-06-30 DIAGNOSIS — I10 ESSENTIAL HYPERTENSION: ICD-10-CM

## 2025-06-30 PROCEDURE — 3008F BODY MASS INDEX DOCD: CPT | Mod: CPTII,S$GLB,, | Performed by: NURSE PRACTITIONER

## 2025-06-30 PROCEDURE — 3066F NEPHROPATHY DOC TX: CPT | Mod: CPTII,S$GLB,, | Performed by: NURSE PRACTITIONER

## 2025-06-30 PROCEDURE — 99999 PR PBB SHADOW E&M-EST. PATIENT-LVL IV: CPT | Mod: PBBFAC,,, | Performed by: NURSE PRACTITIONER

## 2025-06-30 PROCEDURE — 1126F AMNT PAIN NOTED NONE PRSNT: CPT | Mod: CPTII,S$GLB,, | Performed by: NURSE PRACTITIONER

## 2025-06-30 PROCEDURE — 4010F ACE/ARB THERAPY RXD/TAKEN: CPT | Mod: CPTII,S$GLB,, | Performed by: NURSE PRACTITIONER

## 2025-06-30 PROCEDURE — 3074F SYST BP LT 130 MM HG: CPT | Mod: CPTII,S$GLB,, | Performed by: NURSE PRACTITIONER

## 2025-06-30 PROCEDURE — 1159F MED LIST DOCD IN RCRD: CPT | Mod: CPTII,S$GLB,, | Performed by: NURSE PRACTITIONER

## 2025-06-30 PROCEDURE — 1100F PTFALLS ASSESS-DOCD GE2>/YR: CPT | Mod: CPTII,S$GLB,, | Performed by: NURSE PRACTITIONER

## 2025-06-30 PROCEDURE — 3078F DIAST BP <80 MM HG: CPT | Mod: CPTII,S$GLB,, | Performed by: NURSE PRACTITIONER

## 2025-06-30 PROCEDURE — 3288F FALL RISK ASSESSMENT DOCD: CPT | Mod: CPTII,S$GLB,, | Performed by: NURSE PRACTITIONER

## 2025-06-30 PROCEDURE — 99214 OFFICE O/P EST MOD 30 MIN: CPT | Mod: S$GLB,,, | Performed by: NURSE PRACTITIONER

## 2025-06-30 PROCEDURE — 1160F RVW MEDS BY RX/DR IN RCRD: CPT | Mod: CPTII,S$GLB,, | Performed by: NURSE PRACTITIONER

## 2025-06-30 PROCEDURE — 3044F HG A1C LEVEL LT 7.0%: CPT | Mod: CPTII,S$GLB,, | Performed by: NURSE PRACTITIONER

## 2025-06-30 PROCEDURE — G2211 COMPLEX E/M VISIT ADD ON: HCPCS | Mod: S$GLB,,, | Performed by: NURSE PRACTITIONER

## 2025-06-30 PROCEDURE — 3061F NEG MICROALBUMINURIA REV: CPT | Mod: CPTII,S$GLB,, | Performed by: NURSE PRACTITIONER

## 2025-06-30 RX ORDER — EMPAGLIFLOZIN 25 MG/1
25 TABLET, FILM COATED ORAL DAILY
Qty: 90 TABLET | Refills: 3 | Status: SHIPPED | OUTPATIENT
Start: 2025-06-30

## 2025-06-30 NOTE — PROGRESS NOTES
CC: Ms. Sheron Boudreaux arrives today for management of Type 2 DM and review of chronic medical conditions, as listed in the Visit Diagnosis section of this encounter.       HPI: Ms. Sheron Boudreaux was diagnosed with Type 2 DM in 2003. She was diagnosed based on lab work. Initial treatment consisted of metformin. + FH of DM in mother, father. Denies hospitalizations due to DM.   Of note, pt has past h/o isolated pancreatitis, following hysterectomy. Has followed with Dr. Romero.   She has dx of CAD with past stent placement. Follows with Dr. Solorzano.     Patient was last seen be me in January. Jardiance was added at her last visit.    She had initial constipation, which she thinks was related to not drinking enough water. This has resolved.     BG readings are checked infrequently.     Hypoglycemia: No    Missing Insulin/PO medication doses: No    Exercise: No formal.    Dietary Habits: Eats 2-3 meals/day.  Occasional afternoon snack. Avoids sugary beverages.    Last DM education appointment: 3/2020    Regarding hyperlipidemia, she had been off of pravastatin for the past few weeks, due to issue with the pharmacy. She is expecting delivery soon.       CURRENT DIABETIC MEDS: metformin 1000 mg BID, Jardiance 10 mg daily  Glucometer type:     Previous DM treatments:      Last Eye Exam: 3/2025, Dr. Zapata, also sees retina specialist, Dr. Denny. No DR. + dry MD.   Last Podiatry Exam: n/a    REVIEW OF SYSTEMS  Constitutional: no c/o fatigue, weakness. + 5# weight loss.   Cardiac: no palpitations or chest pain.  Respiratory: no cough, dyspnea.   GI: no nausea, abdominal pain.  + h/o pancreatitis x 1. Reports dx of irritable bowel syndrome, bloating. Follows with Dr. Romero.   : denies urinary frequency, burning, discharge, frequent UTIs  Skin: no lesions or rashes.  Neuro: no numbness, tingling, paresthesias. + numbness in hands that is positional   Endocrine: denies polyphagia, polydipsia,  polyuria      Personally reviewed Past Medical, Surgical, Social History.    Vital Signs  /60   Pulse 63   Ht 5' (1.524 m)   Wt 108.1 kg (238 lb 5.1 oz)   BMI 46.54 kg/m²     Personally reviewed the below labs:    Hemoglobin A1C   Date Value Ref Range Status   06/28/2025 6.8 (H) 4.0 - 5.6 % Final     Comment:     Reference Interval:  5.0 - 5.6 Normal   5.7 - 6.4 High Risk   > 6.5 Diabetic      Hgb A1c results are standardized based on the (NGSP) National   Glycohemoglobin Standardization Program.      Hemoglobin A1C levels are related to mean serum/plasma glucose   during the preceding 2-3 months.        01/25/2025 6.9 (H) 4.0 - 5.6 % Final     Comment:     Reference Interval:  5.0 - 5.6 Normal   5.7 - 6.4 High Risk   > 6.5 Diabetic      Hgb A1c results are standardized based on the (NGSP) National   Glycohemoglobin Standardization Program.      Hemoglobin A1C levels are related to mean serum/plasma glucose   during the preceding 2-3 months.        08/12/2024 6.4 (H) 4.0 - 5.6 % Final     Comment:     ADA Screening Guidelines:  5.7-6.4%  Consistent with prediabetes  >or=6.5%  Consistent with diabetes    High levels of fetal hemoglobin interfere with the HbA1C  assay. Heterozygous hemoglobin variants (HbS, HgC, etc)do  not significantly interfere with this assay.   However, presence of multiple variants may affect accuracy.         Chemistry        Component Value Date/Time     06/28/2025 1044     06/28/2025 1044     07/16/2015 1000    K 4.4 06/28/2025 1044    K 4.4 06/28/2025 1044    K 4.5 07/16/2015 1000     06/28/2025 1044     06/28/2025 1044    CL 98 07/16/2015 1000    CO2 30 (H) 06/28/2025 1044    CO2 30 (H) 06/28/2025 1044    BUN 18 06/28/2025 1044    BUN 18 06/28/2025 1044    CREATININE 0.87 06/28/2025 1044    CREATININE 0.87 06/28/2025 1044    CREATININE 0.89 07/16/2015 1000     (H) 06/28/2025 1044     (H) 06/28/2025 1044        Component Value Date/Time     CALCIUM 9.0 06/28/2025 1044    CALCIUM 9.0 06/28/2025 1044    ALKPHOS 100 06/28/2025 1044    ALKPHOS 100 06/28/2025 1044    AST 13 06/28/2025 1044    AST 13 06/28/2025 1044    AST 13 (L) 04/09/2016 1013    ALT 13 06/28/2025 1044    ALT 13 06/28/2025 1044    BILITOT 0.3 06/28/2025 1044    BILITOT 0.3 06/28/2025 1044    ESTGFRAFRICA >60 02/26/2022 1014    EGFRNONAA >60 02/26/2022 1014          Lab Results   Component Value Date    CHOL 222 (H) 06/28/2025    CHOL 159 01/25/2025    CHOL 148 05/13/2024     Lab Results   Component Value Date    HDL 49 06/28/2025    HDL 51 01/25/2025    HDL 52 05/13/2024     Lab Results   Component Value Date    LDLCALC 134.2 06/28/2025    LDLCALC 83.6 01/25/2025    LDLCALC 62.4 (L) 05/13/2024     Lab Results   Component Value Date    TRIG 194 (H) 06/28/2025    TRIG 122 01/25/2025    TRIG 168 (H) 05/13/2024     Lab Results   Component Value Date    CHOLHDL 22.1 06/28/2025    CHOLHDL 32.1 01/25/2025    CHOLHDL 35.1 05/13/2024       Lab Results   Component Value Date    MICALBCREAT Unable to calculate 01/25/2025     Lab Results   Component Value Date    TSH 0.527 06/28/2025       CrCl cannot be calculated (Unknown ideal weight.).    Vit D, 25-Hydroxy   Date Value Ref Range Status   01/25/2025 36 30 - 96 ng/mL Final     Comment:     Vitamin D deficiency.........<10 ng/mL                              Vitamin D insufficiency......10-29 ng/mL       Vitamin D sufficiency........> or equal to 30 ng/mL  Vitamin D toxicity............>100 ng/mL           PHYSICAL EXAMINATION  Constitutional: Appears well, no distress  Respiratory: CTA, even and unlabored.  Cardiovascular: RRR, no murmurs  GI: bowel sounds active, no hernia noted  Skin: warm and dry; no visible wounds  Neuro: oriented to person, place, time  Feet: appropriate footwear.     Goals         80 <= Glucose <= 180 (pt-stated)       Blood Pressure < 140/90       Exercise at least 150 minutes per week.       HEMOGLOBIN A1C < 8.0       Take  at least one BP reading per week at various times of the day               Assessment/Plan  1. Type 2 diabetes mellitus with other specified complication, without long-term current use of insulin  -- Reasonable. Fasting glucose in 140s.   -- increase Jardiance to 25 mg daily. Reinforced importance of adequate hydration.   -- continue metformin.  -- if needed, can consider GLP-1RA in the future. Aware of 1 past pancreatitis episode. Would assess lipase before, during treatment.    -- takes aspirin, ace-I, statin   2. Coronary artery disease involving native coronary artery of native heart without angina pectoris  -- follows with cardiology    3. Essential hypertension  -- controlled  -- continue current medications   4. Pure hypercholesterolemia  -- controlled   -- continue statin   5. Hypothyroidism, unspecified type  -- stable  -- continue current LT4 dose   6. History of pancreatitis  -- isolated episode  -- caution with use of GLP-1RA, DPP4-i   7. Class 3 severe obesity due to excess calories without serious comorbidity with body mass index (BMI) of 45.0 to 49.9 in adult  -- uncontrolled  Body mass index is 46.54 kg/m².         FOLLOW UP  Follow up in about 6 months (around 12/30/2025).   Patient instructed to bring BG logs to each follow up   Patient encouraged to call for any BG/medication issues, concerns, or questions.    Orders Placed This Encounter   Procedures    Hemoglobin A1C    Comprehensive Metabolic Panel

## 2025-06-30 NOTE — PATIENT INSTRUCTIONS
If Vitamin D2 prescription is too expensive, can transition to DAILY Vitamin D3 5,000 IU supplement.